# Patient Record
Sex: FEMALE | Race: WHITE
[De-identification: names, ages, dates, MRNs, and addresses within clinical notes are randomized per-mention and may not be internally consistent; named-entity substitution may affect disease eponyms.]

---

## 2018-10-16 ENCOUNTER — HOSPITAL ENCOUNTER (INPATIENT)
Dept: HOSPITAL 12 - ER | Age: 68
LOS: 7 days | Discharge: HOME HEALTH | DRG: 885 | End: 2018-10-23
Attending: INTERNAL MEDICINE
Payer: MEDICARE

## 2018-10-16 VITALS — HEIGHT: 61 IN | BODY MASS INDEX: 27.75 KG/M2 | WEIGHT: 147 LBS

## 2018-10-16 DIAGNOSIS — R73.03: ICD-10-CM

## 2018-10-16 DIAGNOSIS — F31.64: Primary | ICD-10-CM

## 2018-10-16 DIAGNOSIS — K59.00: ICD-10-CM

## 2018-10-16 DIAGNOSIS — Z87.01: ICD-10-CM

## 2018-10-16 DIAGNOSIS — F41.9: ICD-10-CM

## 2018-10-16 DIAGNOSIS — E44.0: ICD-10-CM

## 2018-10-16 DIAGNOSIS — F42.9: ICD-10-CM

## 2018-10-16 PROCEDURE — A4663 DIALYSIS BLOOD PRESSURE CUFF: HCPCS

## 2018-10-16 RX ADMIN — CLONAZEPAM PRN MG: 0.5 TABLET ORAL at 23:04

## 2018-10-16 NOTE — NUR
Pt. admitted to [facility], under care of Dr. GREEN/KEREN.

Diagnosis: Psychosis

Belongs List completed.  MRSA swab done.

report given to Tess TAPIA.

## 2018-10-16 NOTE — NUR
67 Y.O  FEMALE BROUGHT TO MHU VIA WHEELCHAIR FROM [facility]. PT ADMITTED IN A 5150 FOR GD. ACCORDING TO THE HOLD, 

PT MEETING CRITERIA OF UNABLE TO DO SELF-CARE ON HER HOME WHERE SHE LIVES 

ALONE. RN CONCURS WITH THE HOLD. ADVISEMENT AND PATIENT RIGHTS HANDBOOK 

GIVEN.

UPON FACE TO FACE ASSESSMENT. PT APPEARS TO REFLECT WHAT IS ON THE HOLD. 

PT IS A+OX2, AND COOPERATIVE WITH ASSESSMENT. PT CRYING DURING THE 

INTERVIEW AND WOULD SMILE AND GIGGLE . PT AGREED TO CONTRACT FOR SAFETY 

INSIDE THE HOSPITAL. PT GAVE VERBAL PERMISSION AND ALSO SIGNED THE PAPER 

TO MIGUE LEE AND GAUTAM LEE. PT EXHIBITS INCONGRUENT AFFECT, 

POOR EYE CONTACT. PT APPEARS UNKEMPT AND HER SKIN IS INTACT.  

AND DR.OLEG WASHINGTON NOTIFIED OF ADMISSION,ORDERS RECEIVED.SKILLED 

NURSING ASSESSMENT DONE. PT ORIENTED TO UNIT AND EDUCATED ON UNIT RULES. 

CONTRABAND (MEDICATION )SENT TO THE PHARMACY. HER [place] ID IS ON OUR 

SECURITY LOCKER IN THE UNIT. VS STABLE. NO AGGRESSIVE BEHAVIOR.

## 2018-10-17 VITALS — DIASTOLIC BLOOD PRESSURE: 49 MMHG | SYSTOLIC BLOOD PRESSURE: 115 MMHG

## 2018-10-17 VITALS — SYSTOLIC BLOOD PRESSURE: 148 MMHG | DIASTOLIC BLOOD PRESSURE: 52 MMHG

## 2018-10-17 VITALS — DIASTOLIC BLOOD PRESSURE: 57 MMHG | SYSTOLIC BLOOD PRESSURE: 119 MMHG

## 2018-10-17 VITALS — SYSTOLIC BLOOD PRESSURE: 116 MMHG | DIASTOLIC BLOOD PRESSURE: 56 MMHG

## 2018-10-17 LAB
CHOLEST SERPL-MCNC: 148 MG/DL (ref ?–200)
HDLC SERPL-MCNC: 69 MG/DL (ref 40–60)
TRIGL SERPL-MCNC: 53 MG/DL (ref 30–150)

## 2018-10-17 RX ADMIN — TEMAZEPAM PRN MG: 7.5 CAPSULE ORAL at 00:18

## 2018-10-17 RX ADMIN — CLONIDINE HYDROCHLORIDE SCH MG: 0.1 TABLET ORAL at 17:00

## 2018-10-17 RX ADMIN — CLONAZEPAM PRN MG: 0.5 TABLET ORAL at 06:28

## 2018-10-17 NOTE — NUR
Activity Group Note:  Patients engaged in listening to music of their choice while coloring 
a picture print available to them.  Patients were asked to engage with their peers to 
discuss the music or their pictures.



Subjective:  "I like this song....I like a lot of different artists like Irina Powell."



Objective:  Patient seemed eager to participate in the group activity.  She engaged and 
initiated conversation with her peers. Patient asked to see other patient's work.  Patient 
demonstrated some irritability when several patients were talking at the same time.



Assessment: Patient will need assistance in developing coping strategies when she cannot 
control her surroundings.



Plan:  Encourage group attendance as scheduled.   will continue to provide 
encouragement and support in helping the patient engage in group activities.

## 2018-10-17 NOTE — NUR
Initial Discharge Note: Patient currently lives alone in [facility][[address] [place] [address]]. According to the daughter, Vy [[phone]], the patient has a 
"caregiver" who checks in on patient whom also lives in [facility]. Per patient, 
she would like to return to her home when ready for discharge. Per PT evaluation, patient 
may benefit from a SNF or home health if returning home.  will continue to 
monitor patient and follow-up with PT on best plan for discharge.  will 
collaborate with patient, patient daughter, and MD on a safe and proper discharge.

## 2018-10-18 VITALS — SYSTOLIC BLOOD PRESSURE: 117 MMHG | DIASTOLIC BLOOD PRESSURE: 54 MMHG

## 2018-10-18 VITALS — SYSTOLIC BLOOD PRESSURE: 103 MMHG | DIASTOLIC BLOOD PRESSURE: 36 MMHG

## 2018-10-18 VITALS — SYSTOLIC BLOOD PRESSURE: 107 MMHG

## 2018-10-18 LAB
ALP SERPL-CCNC: 87 U/L (ref 50–136)
ALT SERPL W/O P-5'-P-CCNC: 21 U/L (ref 14–59)
AST SERPL-CCNC: 22 U/L (ref 15–37)
BASOPHILS # BLD AUTO: 0 K/UL (ref 0–8)
BASOPHILS NFR BLD AUTO: 0.7 % (ref 0–2)
BILIRUB SERPL-MCNC: 0.4 MG/DL (ref 0.2–1)
BUN SERPL-MCNC: 15 MG/DL (ref 7–18)
CHLORIDE SERPL-SCNC: 105 MMOL/L (ref 98–107)
CO2 SERPL-SCNC: 32 MMOL/L (ref 21–32)
CREAT SERPL-MCNC: 1.1 MG/DL (ref 0.6–1.3)
EOSINOPHIL # BLD AUTO: 0.2 K/UL (ref 0–0.7)
EOSINOPHIL NFR BLD AUTO: 3 % (ref 0–7)
GLUCOSE SERPL-MCNC: 108 MG/DL (ref 74–106)
HCT VFR BLD AUTO: 36.8 % (ref 31.2–41.9)
HGB BLD-MCNC: 12.1 G/DL (ref 10.9–14.3)
LYMPHOCYTES # BLD AUTO: 2.1 K/UL (ref 20–40)
LYMPHOCYTES NFR BLD AUTO: 34.1 % (ref 20.5–51.5)
MAGNESIUM SERPL-MCNC: 2.2 MG/DL (ref 1.8–2.4)
MCH RBC QN AUTO: 27.9 UUG (ref 24.7–32.8)
MCHC RBC AUTO-ENTMCNC: 33 G/DL (ref 32.3–35.6)
MCV RBC AUTO: 84.8 FL (ref 75.5–95.3)
MONOCYTES # BLD AUTO: 0.4 K/UL (ref 2–10)
MONOCYTES NFR BLD AUTO: 6.3 % (ref 0–11)
NEUTROPHILS # BLD AUTO: 3.4 K/UL (ref 1.8–8.9)
NEUTROPHILS NFR BLD AUTO: 55.9 % (ref 38.5–71.5)
PHOSPHATE SERPL-MCNC: 3.3 MG/DL (ref 2.5–4.9)
PLATELET # BLD AUTO: 408 K/UL (ref 179–408)
POTASSIUM SERPL-SCNC: 4.2 MMOL/L (ref 3.5–5.1)
RBC # BLD AUTO: 4.34 MIL/UL (ref 3.63–4.92)
TSH SERPL DL<=0.005 MIU/L-ACNC: 1.88 MIU/ML (ref 0.36–3.74)
WBC # BLD AUTO: 6.1 K/UL (ref 3.8–11.8)
WS STN SPEC: 6.8 G/DL (ref 6.4–8.2)

## 2018-10-18 RX ADMIN — FLUVOXAMINE MALEATE SCH MG: 50 TABLET, FILM COATED ORAL at 08:47

## 2018-10-18 RX ADMIN — ACETAMINOPHEN PRN MG: 325 TABLET ORAL at 03:11

## 2018-10-18 RX ADMIN — LAMOTRIGINE SCH MG: 100 TABLET ORAL at 20:37

## 2018-10-18 RX ADMIN — CLONIDINE HYDROCHLORIDE SCH MG: 0.1 TABLET ORAL at 17:00

## 2018-10-18 RX ADMIN — CLONIDINE HYDROCHLORIDE SCH MG: 0.1 TABLET ORAL at 08:48

## 2018-10-18 NOTE — NUR
RECEIVED PATIENT IN THE HALLWAY, HE IS NOTED A/O X 3. SHE IS ABLE TO AMBULATE WITH STEADY 
GAIT WITH THE AID OF A WALKER. SHE IS NOTED WITH LOW MOOD, BLUNTED AFFECT. SHE DENIES ANY 
THOUGHT TO HARM SELF, DENIES HI. DENIES AH/VH. SHE IS NOTED WITH FAIR INSIGHT AS TO THE 
REASON FOR HER ADMISSION TO MHU. SAFETY WAS EMPHASIS, WILL CONTINUE TO MONITOR.

## 2018-10-19 VITALS — SYSTOLIC BLOOD PRESSURE: 103 MMHG | DIASTOLIC BLOOD PRESSURE: 26 MMHG

## 2018-10-19 VITALS — DIASTOLIC BLOOD PRESSURE: 56 MMHG | SYSTOLIC BLOOD PRESSURE: 113 MMHG

## 2018-10-19 VITALS — SYSTOLIC BLOOD PRESSURE: 101 MMHG | DIASTOLIC BLOOD PRESSURE: 45 MMHG

## 2018-10-19 VITALS — DIASTOLIC BLOOD PRESSURE: 30 MMHG | SYSTOLIC BLOOD PRESSURE: 108 MMHG

## 2018-10-19 RX ADMIN — CLONIDINE HYDROCHLORIDE SCH MG: 0.1 TABLET ORAL at 16:53

## 2018-10-19 RX ADMIN — FLUVOXAMINE MALEATE SCH MG: 50 TABLET, FILM COATED ORAL at 08:28

## 2018-10-19 RX ADMIN — AZITHROMYCIN SCH MG: 250 TABLET, FILM COATED ORAL at 12:05

## 2018-10-19 RX ADMIN — ACETAMINOPHEN PRN MG: 325 TABLET ORAL at 12:10

## 2018-10-19 RX ADMIN — LAMOTRIGINE SCH MG: 100 TABLET ORAL at 20:04

## 2018-10-19 RX ADMIN — CLONIDINE HYDROCHLORIDE SCH MG: 0.1 TABLET ORAL at 08:28

## 2018-10-19 RX ADMIN — ACETAMINOPHEN PRN MG: 325 TABLET ORAL at 19:56

## 2018-10-19 RX ADMIN — CLONAZEPAM PRN MG: 0.5 TABLET ORAL at 19:51

## 2018-10-19 NOTE — NUR
Activity Group Note:



GOAL

Patient will actively participate in the group activity of the day or relax in the activity 
room with fellow patients.



INTERVENTION

 invited patient to participate in a group activity consisting of music, books 
puzzles and arts & crafts held from 2 -2:45 pm in the activities room.



RESPONSE

Patient expressed interest in participating in arts and crafts. Patient stated she wanted a 
large picture to color, as it is hard for her to see. Pt engaged in a coloring activity. 
Patient had minimal interaction with peers. Patients mood was within normal limits and 
remained cooperative during the activity.



PLAN

Patient will be invited to attend the next group activity.

## 2018-10-19 NOTE — NUR
Patient felt much better after c/o lower back pain. Prn medication administered x1 with 
relief. Call light within reach. No SI noted. Call light within reach. will monitor.

## 2018-10-20 VITALS — DIASTOLIC BLOOD PRESSURE: 46 MMHG | SYSTOLIC BLOOD PRESSURE: 125 MMHG

## 2018-10-20 VITALS — DIASTOLIC BLOOD PRESSURE: 51 MMHG | SYSTOLIC BLOOD PRESSURE: 114 MMHG

## 2018-10-20 VITALS — SYSTOLIC BLOOD PRESSURE: 110 MMHG | DIASTOLIC BLOOD PRESSURE: 46 MMHG

## 2018-10-20 RX ADMIN — CLONIDINE HYDROCHLORIDE SCH MG: 0.1 TABLET ORAL at 16:19

## 2018-10-20 RX ADMIN — CLONIDINE HYDROCHLORIDE SCH MG: 0.1 TABLET ORAL at 08:14

## 2018-10-20 RX ADMIN — AZITHROMYCIN SCH MG: 250 TABLET, FILM COATED ORAL at 11:00

## 2018-10-20 RX ADMIN — FLUVOXAMINE MALEATE SCH MG: 50 TABLET, FILM COATED ORAL at 08:13

## 2018-10-20 RX ADMIN — LAMOTRIGINE SCH MG: 100 TABLET ORAL at 21:13

## 2018-10-20 NOTE — NUR
Patient is compliant with all her medication. Redirectable. Able to express needs. Call 
light within reach. No SI noted. Attended group activity. Bed in low and locked position.

## 2018-10-21 VITALS — SYSTOLIC BLOOD PRESSURE: 114 MMHG | DIASTOLIC BLOOD PRESSURE: 47 MMHG

## 2018-10-21 VITALS — SYSTOLIC BLOOD PRESSURE: 139 MMHG | DIASTOLIC BLOOD PRESSURE: 66 MMHG

## 2018-10-21 VITALS — DIASTOLIC BLOOD PRESSURE: 49 MMHG | SYSTOLIC BLOOD PRESSURE: 127 MMHG

## 2018-10-21 LAB
APPEARANCE UR: CLEAR
BILIRUB UR QL STRIP: NEGATIVE
COLOR UR: YELLOW
GLUCOSE UR STRIP-MCNC: NEGATIVE MG/DL
KETONES UR STRIP-MCNC: NEGATIVE MG/DL
LEUKOCYTE ESTERASE UR QL STRIP: NEGATIVE
NITRITE UR QL STRIP: NEGATIVE
PH UR STRIP: 6 [PH] (ref 5–8)
PROT UR QL STRIP: NEGATIVE
SP GR UR STRIP: 1.02 (ref 1–1.03)
UROBILINOGEN UR STRIP-MCNC: 0.2 E.U./DL

## 2018-10-21 RX ADMIN — ACETAMINOPHEN PRN MG: 325 TABLET ORAL at 08:02

## 2018-10-21 RX ADMIN — CLONIDINE HYDROCHLORIDE SCH MG: 0.1 TABLET ORAL at 17:13

## 2018-10-21 RX ADMIN — FLUVOXAMINE MALEATE SCH MG: 50 TABLET, FILM COATED ORAL at 08:02

## 2018-10-21 RX ADMIN — DOCUSATE SODIUM PRN MG: 100 CAPSULE, LIQUID FILLED ORAL at 14:40

## 2018-10-21 RX ADMIN — CLONIDINE HYDROCHLORIDE SCH MG: 0.1 TABLET ORAL at 08:02

## 2018-10-21 RX ADMIN — LAMOTRIGINE SCH MG: 100 TABLET ORAL at 21:08

## 2018-10-21 RX ADMIN — AZITHROMYCIN SCH MG: 250 TABLET, FILM COATED ORAL at 11:01

## 2018-10-21 NOTE — NUR
Pt is A & O x3, calm, cooperative, med-compliant, cognizant, pt expresses concern about 
still having her part-time job once she is released from the hospital. Pt also expresses 
concern about not being on her home meds, consisting of lexapro and other medications. Pt is 
afraid that she will decompensate to her past OCD behavior because she is off of her home 
meds. I will endorse to day shift nurse to f/u.

## 2018-10-22 VITALS — DIASTOLIC BLOOD PRESSURE: 47 MMHG | SYSTOLIC BLOOD PRESSURE: 116 MMHG

## 2018-10-22 VITALS — SYSTOLIC BLOOD PRESSURE: 132 MMHG | DIASTOLIC BLOOD PRESSURE: 50 MMHG

## 2018-10-22 VITALS — SYSTOLIC BLOOD PRESSURE: 100 MMHG | DIASTOLIC BLOOD PRESSURE: 53 MMHG

## 2018-10-22 LAB
BASOPHILS # BLD AUTO: 0.1 K/UL (ref 0–8)
BASOPHILS NFR BLD AUTO: 1.3 % (ref 0–2)
BUN SERPL-MCNC: 18 MG/DL (ref 7–18)
CHLORIDE SERPL-SCNC: 104 MMOL/L (ref 98–107)
CO2 SERPL-SCNC: 30 MMOL/L (ref 21–32)
CREAT SERPL-MCNC: 0.9 MG/DL (ref 0.6–1.3)
DEPRECATED SQUAMOUS URNS QL MICRO: (no result) /HPF
EOSINOPHIL # BLD AUTO: 0.2 K/UL (ref 0–0.7)
EOSINOPHIL NFR BLD AUTO: 3.5 % (ref 0–7)
GLUCOSE SERPL-MCNC: 95 MG/DL (ref 74–106)
HCT VFR BLD AUTO: 38.4 % (ref 31.2–41.9)
HGB BLD-MCNC: 12.4 G/DL (ref 10.9–14.3)
LYMPHOCYTES # BLD AUTO: 2.4 K/UL (ref 20–40)
LYMPHOCYTES NFR BLD AUTO: 41.1 % (ref 20.5–51.5)
MAGNESIUM SERPL-MCNC: 2 MG/DL (ref 1.8–2.4)
MCH RBC QN AUTO: 27.6 UUG (ref 24.7–32.8)
MCHC RBC AUTO-ENTMCNC: 32 G/DL (ref 32.3–35.6)
MCV RBC AUTO: 85.2 FL (ref 75.5–95.3)
MONOCYTES # BLD AUTO: 0.3 K/UL (ref 2–10)
MONOCYTES NFR BLD AUTO: 5 % (ref 0–11)
NEUTROPHILS # BLD AUTO: 2.9 K/UL (ref 1.8–8.9)
NEUTROPHILS NFR BLD AUTO: 49.1 % (ref 38.5–71.5)
PHOSPHATE SERPL-MCNC: 3.3 MG/DL (ref 2.5–4.9)
PLATELET # BLD AUTO: 306 K/UL (ref 179–408)
POTASSIUM SERPL-SCNC: 4.2 MMOL/L (ref 3.5–5.1)
RBC # BLD AUTO: 4.51 MIL/UL (ref 3.63–4.92)
RBC #/AREA URNS HPF: (no result) /HPF (ref 0–3)
RENAL EPI CELLS #/AREA URNS LPF: (no result) /LPF
TRANS CELLS #/AREA URNS LPF: (no result) /LPF
WBC # BLD AUTO: 5.9 K/UL (ref 3.8–11.8)
WBC #/AREA URNS HPF: (no result) /HPF
WBC #/AREA URNS HPF: (no result) /HPF (ref 0–3)

## 2018-10-22 RX ADMIN — CLONIDINE HYDROCHLORIDE SCH MG: 0.1 TABLET ORAL at 09:37

## 2018-10-22 RX ADMIN — LAMOTRIGINE SCH MG: 100 TABLET ORAL at 21:05

## 2018-10-22 RX ADMIN — CLONIDINE HYDROCHLORIDE SCH MG: 0.1 TABLET ORAL at 17:00

## 2018-10-22 RX ADMIN — AZITHROMYCIN SCH MG: 250 TABLET, FILM COATED ORAL at 12:57

## 2018-10-22 RX ADMIN — FLUVOXAMINE MALEATE SCH MG: 50 TABLET, FILM COATED ORAL at 09:37

## 2018-10-22 NOTE — NUR
Firearms Report:  completed and submitted a [employer] firearms report for a a 5250 
grave disability certification.

## 2018-10-22 NOTE — NUR
RECEIVED PATIENT IN THE DAY ROOM WATCHING TV. SHE IS NOTED A/O X 3. SHE IS ABLE TO AMBULATE 
WITH STEADY GAIT. DENIES SI, DENIES AV/VH SHE IS ABLE TO CFS. SAFETY WAS EMPHASIS. WILL 
CONTINUE TO MONITOR,

## 2018-10-23 VITALS — SYSTOLIC BLOOD PRESSURE: 108 MMHG | DIASTOLIC BLOOD PRESSURE: 49 MMHG

## 2018-10-23 RX ADMIN — CLONIDINE HYDROCHLORIDE SCH MG: 0.1 TABLET ORAL at 08:49

## 2018-10-23 RX ADMIN — FLUVOXAMINE MALEATE SCH MG: 50 TABLET, FILM COATED ORAL at 08:11

## 2018-10-23 RX ADMIN — ACETAMINOPHEN PRN MG: 325 TABLET ORAL at 08:12

## 2018-10-23 RX ADMIN — TEMAZEPAM PRN MG: 7.5 CAPSULE ORAL at 00:03

## 2018-10-23 RX ADMIN — DOCUSATE SODIUM PRN MG: 100 CAPSULE, LIQUID FILLED ORAL at 13:38

## 2018-10-23 NOTE — NUR
Discharge Note:  Patient will be discharged back home today [[address], [address] 
[place] [address]; [phone]] via private transportation at 2pm.  Patients friend, Stacey Butler ([phone]) will be providing transportation for the patient.  Spoke with 
patients daughter, Vy ([phone]) who is aware and agreeable with discharge plans. 
Patient is alert and oriented x3, denies SI/HI, and is agreeable with discharge plans. 
Patient will continue to follow-up with her Primary Care Physician, Dr. Brandon Looney [[address] [place] [address]; [phone]].  Patient will also continue to 
follow-up with her outpatient Psychiatrist, Dr. Juaquin Powell [[address], [place] [address]; [phone]]. Per request, pt was provided with a list of 
Medicare-accepting Psychiatrists in her area.  A Home Health Order for home safety 
evaluation, medication management, and PT was faxed to [facility]  (ph. [phone]; fax 
[phone]).  Spoke with Rimma in intake, and she reports she will call this writer 
about patients acceptance and start of care date.  Patient was provided with outpatient 
mental health resources to [facility] Crisis Line [phone], Rosa Baig 
[phone], and the National Suicide Prevention Lifeline 1-278.989.8734.

-------------------------------------------------------------------------------

Addendum: 10/23/18 at 1213 by YANG FLOOD

-------------------------------------------------------------------------------

Received call from Rimma contreras [facility] ([phone]) who stated that 
patient was accepted for Home Health with a Start of Care on 10/24/18.

## 2018-10-23 NOTE — NUR
Pt is being discharged home and being picked up by her friend Stacey RAMON. Pt is A./O x 4, 
agreeing to go. No distress noted. Discharge instructions are given including following up 
with a psychiatrist. Pt verbalizes understanding.

## 2018-10-25 NOTE — NUR
Social Service Note: , Bri, received a voicemail left by the 
patient stating that she "needed to speak with her psychiatrist" and that it was an 
"emergency". Bri relayed message to , who called patient back at home phone number 
[[phone]]. Writer was unable to reach patient, but left a voicemail with direct phone 
number for patient to call back. Writer also instructed patient to go to emergency room if 
she was in fact experiencing a medical or psychiatric emergency.

## 2020-02-03 ENCOUNTER — HOSPITAL ENCOUNTER (INPATIENT)
Dept: HOSPITAL 12 - ER | Age: 70
LOS: 8 days | Discharge: HOME | DRG: 73 | End: 2020-02-11
Payer: MEDICARE

## 2020-02-03 VITALS — BODY MASS INDEX: 28.35 KG/M2 | HEIGHT: 63 IN | WEIGHT: 160 LBS

## 2020-02-03 VITALS — DIASTOLIC BLOOD PRESSURE: 66 MMHG | SYSTOLIC BLOOD PRESSURE: 112 MMHG

## 2020-02-03 VITALS — SYSTOLIC BLOOD PRESSURE: 101 MMHG | DIASTOLIC BLOOD PRESSURE: 54 MMHG

## 2020-02-03 VITALS — DIASTOLIC BLOOD PRESSURE: 66 MMHG | SYSTOLIC BLOOD PRESSURE: 125 MMHG

## 2020-02-03 DIAGNOSIS — J98.11: ICD-10-CM

## 2020-02-03 DIAGNOSIS — I50.31: ICD-10-CM

## 2020-02-03 DIAGNOSIS — T43.225A: ICD-10-CM

## 2020-02-03 DIAGNOSIS — F31.9: ICD-10-CM
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
## 2020-02-03 DIAGNOSIS — G90.8: Primary | ICD-10-CM

## 2020-02-03 DIAGNOSIS — T43.505A: ICD-10-CM

## 2020-02-03 DIAGNOSIS — M54.31: ICD-10-CM

## 2020-02-03 DIAGNOSIS — G92: ICD-10-CM

## 2020-02-03 DIAGNOSIS — F31.64: ICD-10-CM

## 2020-02-03 DIAGNOSIS — G25.2: ICD-10-CM

## 2020-02-03 DIAGNOSIS — R65.10: ICD-10-CM

## 2020-02-03 DIAGNOSIS — I21.A1: ICD-10-CM

## 2020-02-03 DIAGNOSIS — F42.9: ICD-10-CM
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
## 2020-02-03 DIAGNOSIS — I67.2: ICD-10-CM

## 2020-02-03 DIAGNOSIS — F41.9: ICD-10-CM

## 2020-02-03 DIAGNOSIS — G24.01: ICD-10-CM

## 2020-02-03 DIAGNOSIS — E86.0: ICD-10-CM

## 2020-02-03 LAB
ALP SERPL-CCNC: 100 U/L (ref 50–136)
ALT SERPL W/O P-5'-P-CCNC: 22 U/L (ref 14–59)
AMPHETAMINES UR QL SCN>1000 NG/ML: NEGATIVE
APPEARANCE UR: CLEAR
AST SERPL-CCNC: 24 U/L (ref 15–37)
BARBITURATES UR QL SCN: NEGATIVE
BASOPHILS # BLD AUTO: 0 K/UL (ref 0–8)
BASOPHILS NFR BLD AUTO: 0.4 % (ref 0–2)
BILIRUB SERPL-MCNC: 0.3 MG/DL (ref 0.2–1)
BILIRUB UR QL STRIP: NEGATIVE
BUN SERPL-MCNC: 21 MG/DL (ref 7–18)
CHLORIDE SERPL-SCNC: 104 MMOL/L (ref 98–107)
CO2 SERPL-SCNC: 28 MMOL/L (ref 21–32)
COCAINE UR QL SCN: NEGATIVE
COLOR UR: YELLOW
CREAT SERPL-MCNC: 1.1 MG/DL (ref 0.6–1.3)
EOSINOPHIL # BLD AUTO: 0 K/UL (ref 0–0.7)
EOSINOPHIL NFR BLD AUTO: 0.2 % (ref 0–7)
GLUCOSE SERPL-MCNC: 129 MG/DL (ref 74–106)
GLUCOSE UR STRIP-MCNC: NEGATIVE MG/DL
HCT VFR BLD AUTO: 37.2 % (ref 31.2–41.9)
HGB BLD-MCNC: 12.3 G/DL (ref 10.9–14.3)
HGB UR QL STRIP: NEGATIVE
KETONES UR STRIP-MCNC: NEGATIVE MG/DL
LEUKOCYTE ESTERASE UR QL STRIP: NEGATIVE
LYMPHOCYTES # BLD AUTO: 0.9 K/UL (ref 20–40)
LYMPHOCYTES NFR BLD AUTO: 7.6 % (ref 20.5–51.5)
MAGNESIUM SERPL-MCNC: 2 MG/DL (ref 1.8–2.4)
MCH RBC QN AUTO: 27.8 UUG (ref 24.7–32.8)
MCHC RBC AUTO-ENTMCNC: 33 G/DL (ref 32.3–35.6)
MCV RBC AUTO: 84.1 FL (ref 75.5–95.3)
MONOCYTES # BLD AUTO: 0.3 K/UL (ref 2–10)
MONOCYTES NFR BLD AUTO: 2.8 % (ref 0–11)
NEUTROPHILS # BLD AUTO: 10.7 K/UL (ref 1.8–8.9)
NEUTROPHILS NFR BLD AUTO: 89 % (ref 38.5–71.5)
NITRITE UR QL STRIP: NEGATIVE
OPIATES UR QL SCN: NEGATIVE
PCP UR QL SCN>25 NG/ML: NEGATIVE
PH UR STRIP: 5.5 [PH] (ref 5–8)
PHOSPHATE SERPL-MCNC: 2.7 MG/DL (ref 2.5–4.9)
PLATELET # BLD AUTO: 222 K/UL (ref 179–408)
POTASSIUM SERPL-SCNC: 3.6 MMOL/L (ref 3.5–5.1)
RBC # BLD AUTO: 4.42 MIL/UL (ref 3.63–4.92)
SP GR UR STRIP: >=1.03 (ref 1–1.03)
THC UR QL SCN>50 NG/ML: NEGATIVE
UROBILINOGEN UR STRIP-MCNC: 0.2 E.U./DL
WBC # BLD AUTO: 12 K/UL (ref 3.8–11.8)
WS STN SPEC: 7.5 G/DL (ref 6.4–8.2)

## 2020-02-03 PROCEDURE — A4217 STERILE WATER/SALINE, 500 ML: HCPCS

## 2020-02-03 PROCEDURE — A4663 DIALYSIS BLOOD PRESSURE CUFF: HCPCS

## 2020-02-03 PROCEDURE — C1758 CATHETER, URETERAL: HCPCS

## 2020-02-03 PROCEDURE — G0378 HOSPITAL OBSERVATION PER HR: HCPCS

## 2020-02-03 RX ADMIN — LORAZEPAM PRN MG: 2 INJECTION INTRAMUSCULAR; INTRAVENOUS at 22:29

## 2020-02-03 RX ADMIN — ACETAMINOPHEN PRN MG: 325 TABLET ORAL at 20:03

## 2020-02-03 RX ADMIN — Medication SCH MG: at 20:51

## 2020-02-03 RX ADMIN — ATORVASTATIN CALCIUM SCH MG: 10 TABLET, FILM COATED ORAL at 20:34

## 2020-02-03 RX ADMIN — SODIUM CHLORIDE PRN MLS/HR: 0.9 INJECTION, SOLUTION INTRAVENOUS at 20:29

## 2020-02-03 NOTE — NUR
PATIENT ALERT ORIENTED NO SOB NO CHEST PAIN, TELE MONITOR SINUS RHYTHM, SINUS TACHY.  
PATIENT HAS ANXIETY MB RESTLESSNESS WHILE IN BED, PATIENT GIVEN FOOD AND JUICES, KEPT CLEAN 
AND DRY, CONT TO MONITOR.

## 2020-02-03 NOTE — NUR
PATIENT HAVING LOTS OF ANXIETY, RESTLESSNESS IN BED, NOTIFY ROOPA KEATING NP WITH ORDER OF 
ATIVAN IV PRN.

## 2020-02-03 NOTE — NUR
Patient received from ER around 1240 patient in stable condition with stable vital signs; 
patient family at bedside towards end of shift ; patient very shaky ; Md notified ; strait 
cath done for urine sample. admission finished. Report given on coming nurse.

## 2020-02-04 VITALS — DIASTOLIC BLOOD PRESSURE: 62 MMHG | SYSTOLIC BLOOD PRESSURE: 111 MMHG

## 2020-02-04 VITALS — SYSTOLIC BLOOD PRESSURE: 114 MMHG | DIASTOLIC BLOOD PRESSURE: 52 MMHG

## 2020-02-04 VITALS — DIASTOLIC BLOOD PRESSURE: 46 MMHG | SYSTOLIC BLOOD PRESSURE: 97 MMHG

## 2020-02-04 VITALS — DIASTOLIC BLOOD PRESSURE: 59 MMHG | SYSTOLIC BLOOD PRESSURE: 117 MMHG

## 2020-02-04 VITALS — DIASTOLIC BLOOD PRESSURE: 47 MMHG | SYSTOLIC BLOOD PRESSURE: 96 MMHG

## 2020-02-04 VITALS — DIASTOLIC BLOOD PRESSURE: 46 MMHG | SYSTOLIC BLOOD PRESSURE: 95 MMHG

## 2020-02-04 VITALS — DIASTOLIC BLOOD PRESSURE: 47 MMHG | SYSTOLIC BLOOD PRESSURE: 102 MMHG

## 2020-02-04 VITALS — DIASTOLIC BLOOD PRESSURE: 54 MMHG | SYSTOLIC BLOOD PRESSURE: 99 MMHG

## 2020-02-04 LAB
ALP SERPL-CCNC: 91 U/L (ref 50–136)
ALT SERPL W/O P-5'-P-CCNC: 45 U/L (ref 14–59)
AST SERPL-CCNC: 108 U/L (ref 15–37)
BASOPHILS # BLD AUTO: 0.1 K/UL (ref 0–8)
BASOPHILS NFR BLD AUTO: 0.5 % (ref 0–2)
BILIRUB SERPL-MCNC: 0.6 MG/DL (ref 0.2–1)
BUN SERPL-MCNC: 16 MG/DL (ref 7–18)
CHLORIDE SERPL-SCNC: 106 MMOL/L (ref 98–107)
CHOLEST SERPL-MCNC: 188 MG/DL (ref ?–200)
CO2 SERPL-SCNC: 27 MMOL/L (ref 21–32)
CREAT SERPL-MCNC: 1 MG/DL (ref 0.6–1.3)
EOSINOPHIL # BLD AUTO: 0 K/UL (ref 0–0.7)
EOSINOPHIL NFR BLD AUTO: 0.2 % (ref 0–7)
GLUCOSE SERPL-MCNC: 99 MG/DL (ref 74–106)
HCT VFR BLD AUTO: 35.1 % (ref 31.2–41.9)
HDLC SERPL-MCNC: 78 MG/DL (ref 40–60)
HGB BLD-MCNC: 11.6 G/DL (ref 10.9–14.3)
LYMPHOCYTES # BLD AUTO: 1.8 K/UL (ref 20–40)
LYMPHOCYTES NFR BLD AUTO: 16.9 % (ref 20.5–51.5)
MAGNESIUM SERPL-MCNC: 2 MG/DL (ref 1.8–2.4)
MCH RBC QN AUTO: 28.1 UUG (ref 24.7–32.8)
MCHC RBC AUTO-ENTMCNC: 33 G/DL (ref 32.3–35.6)
MCV RBC AUTO: 85.2 FL (ref 75.5–95.3)
MONOCYTES # BLD AUTO: 0.6 K/UL (ref 2–10)
MONOCYTES NFR BLD AUTO: 5.5 % (ref 0–11)
NEUTROPHILS # BLD AUTO: 8.3 K/UL (ref 1.8–8.9)
NEUTROPHILS NFR BLD AUTO: 76.9 % (ref 38.5–71.5)
PHOSPHATE SERPL-MCNC: 2.7 MG/DL (ref 2.5–4.9)
PLATELET # BLD AUTO: 197 K/UL (ref 179–408)
POTASSIUM SERPL-SCNC: 3.8 MMOL/L (ref 3.5–5.1)
RBC # BLD AUTO: 4.12 MIL/UL (ref 3.63–4.92)
TRIGL SERPL-MCNC: 58 MG/DL (ref 30–150)
TSH SERPL DL<=0.005 MIU/L-ACNC: 1.57 MIU/ML (ref 0.36–3.74)
WBC # BLD AUTO: 10.8 K/UL (ref 3.8–11.8)
WS STN SPEC: 6.9 G/DL (ref 6.4–8.2)

## 2020-02-04 RX ADMIN — Medication SCH EACH: at 16:53

## 2020-02-04 RX ADMIN — Medication SCH MG: at 08:50

## 2020-02-04 RX ADMIN — LAMOTRIGINE SCH MG: 200 TABLET ORAL at 14:28

## 2020-02-04 RX ADMIN — ATORVASTATIN CALCIUM SCH MG: 10 TABLET, FILM COATED ORAL at 20:49

## 2020-02-04 RX ADMIN — PANTOPRAZOLE SODIUM SCH MG: 40 TABLET, DELAYED RELEASE ORAL at 06:58

## 2020-02-04 RX ADMIN — ACETAMINOPHEN PRN MG: 325 TABLET ORAL at 05:56

## 2020-02-04 RX ADMIN — SODIUM CHLORIDE PRN MLS/HR: 0.9 INJECTION, SOLUTION INTRAVENOUS at 09:05

## 2020-02-04 RX ADMIN — SODIUM CHLORIDE PRN MLS/HR: 0.9 INJECTION, SOLUTION INTRAVENOUS at 22:37

## 2020-02-04 RX ADMIN — LORAZEPAM PRN MG: 2 INJECTION INTRAMUSCULAR; INTRAVENOUS at 22:29

## 2020-02-04 RX ADMIN — LAMOTRIGINE SCH MG: 200 TABLET ORAL at 20:49

## 2020-02-04 RX ADMIN — Medication SCH MG: at 20:54

## 2020-02-04 RX ADMIN — FLUVOXAMINE MALEATE SCH MG: 50 TABLET, FILM COATED ORAL at 20:54

## 2020-02-04 RX ADMIN — ASPIRIN SCH MG: 81 TABLET, COATED ORAL at 08:46

## 2020-02-04 RX ADMIN — Medication SCH EACH: at 22:16

## 2020-02-04 NOTE — NUR
AWAKE ALERT AND RESPOND APPROPRIATELY ONLY TO SIMPLE QUESTIONS. GETS CONFUSED ON AND OFF AND 
TRYING TO GET OUT OF BED. OBSERVED.

## 2020-02-04 NOTE — NUR
PATIENT ALERT, NO SOB NO CHEST PAIN NOTED, TELE MONITOR SINUS RYTHYM AT THIS TIME. PATIENT 
NOTED WITH RESTLESSNESS IN BED, KEPT PATIENT CLEAN DRY AND COMFORTABLE, CALL LIGHT WITHIN 
REACH. CONT TO MONITOR.

## 2020-02-04 NOTE — NUR
PATIENT ALWAYS TRYING TO GET OUT OF BED, PLACED PATIENT TO ROOM 311 CLOSER TO NURSING 
STATION FOR SAFETY

## 2020-02-05 VITALS — DIASTOLIC BLOOD PRESSURE: 40 MMHG | SYSTOLIC BLOOD PRESSURE: 101 MMHG

## 2020-02-05 VITALS — DIASTOLIC BLOOD PRESSURE: 48 MMHG | SYSTOLIC BLOOD PRESSURE: 99 MMHG

## 2020-02-05 VITALS — SYSTOLIC BLOOD PRESSURE: 132 MMHG | DIASTOLIC BLOOD PRESSURE: 56 MMHG

## 2020-02-05 VITALS — DIASTOLIC BLOOD PRESSURE: 60 MMHG | SYSTOLIC BLOOD PRESSURE: 125 MMHG

## 2020-02-05 VITALS — SYSTOLIC BLOOD PRESSURE: 124 MMHG | DIASTOLIC BLOOD PRESSURE: 54 MMHG

## 2020-02-05 LAB
ALP SERPL-CCNC: 81 U/L (ref 50–136)
ALT SERPL W/O P-5'-P-CCNC: 72 U/L (ref 14–59)
AST SERPL-CCNC: 143 U/L (ref 15–37)
BASOPHILS # BLD AUTO: 0.1 K/UL (ref 0–8)
BASOPHILS NFR BLD AUTO: 0.8 % (ref 0–2)
BILIRUB SERPL-MCNC: 0.5 MG/DL (ref 0.2–1)
BUN SERPL-MCNC: 13 MG/DL (ref 7–18)
CHLORIDE SERPL-SCNC: 106 MMOL/L (ref 98–107)
CO2 SERPL-SCNC: 28 MMOL/L (ref 21–32)
CREAT SERPL-MCNC: 0.9 MG/DL (ref 0.6–1.3)
EOSINOPHIL # BLD AUTO: 0.1 K/UL (ref 0–0.7)
EOSINOPHIL NFR BLD AUTO: 0.5 % (ref 0–7)
GLUCOSE SERPL-MCNC: 111 MG/DL (ref 74–106)
HCT VFR BLD AUTO: 34.1 % (ref 31.2–41.9)
HGB BLD-MCNC: 11.1 G/DL (ref 10.9–14.3)
LYMPHOCYTES # BLD AUTO: 2 K/UL (ref 20–40)
LYMPHOCYTES NFR BLD AUTO: 15.8 % (ref 20.5–51.5)
MAGNESIUM SERPL-MCNC: 1.9 MG/DL (ref 1.8–2.4)
MCH RBC QN AUTO: 27.6 UUG (ref 24.7–32.8)
MCHC RBC AUTO-ENTMCNC: 33 G/DL (ref 32.3–35.6)
MCV RBC AUTO: 85 FL (ref 75.5–95.3)
MONOCYTES # BLD AUTO: 0.7 K/UL (ref 2–10)
MONOCYTES NFR BLD AUTO: 5.8 % (ref 0–11)
NEUTROPHILS # BLD AUTO: 9.6 K/UL (ref 1.8–8.9)
NEUTROPHILS NFR BLD AUTO: 77.1 % (ref 38.5–71.5)
PHOSPHATE SERPL-MCNC: 1.9 MG/DL (ref 2.5–4.9)
PLATELET # BLD AUTO: 186 K/UL (ref 179–408)
POTASSIUM SERPL-SCNC: 3.4 MMOL/L (ref 3.5–5.1)
RBC # BLD AUTO: 4.01 MIL/UL (ref 3.63–4.92)
WBC # BLD AUTO: 12.5 K/UL (ref 3.8–11.8)
WS STN SPEC: 6.5 G/DL (ref 6.4–8.2)

## 2020-02-05 RX ADMIN — LORAZEPAM PRN MG: 2 INJECTION INTRAMUSCULAR; INTRAVENOUS at 11:59

## 2020-02-05 RX ADMIN — LAMOTRIGINE SCH MG: 200 TABLET ORAL at 21:00

## 2020-02-05 RX ADMIN — HYDROCODONE BITARTRATE AND ACETAMINOPHEN PRN TAB: 5; 325 TABLET ORAL at 22:57

## 2020-02-05 RX ADMIN — ACETAMINOPHEN PRN MG: 325 TABLET ORAL at 09:12

## 2020-02-05 RX ADMIN — ACETAMINOPHEN PRN MG: 325 TABLET ORAL at 20:43

## 2020-02-05 RX ADMIN — ESCITALOPRAM OXALATE SCH MG: 10 TABLET, FILM COATED ORAL at 09:13

## 2020-02-05 RX ADMIN — Medication SCH MG: at 09:12

## 2020-02-05 RX ADMIN — ATORVASTATIN CALCIUM SCH MG: 10 TABLET, FILM COATED ORAL at 20:44

## 2020-02-05 RX ADMIN — HYDROCODONE BITARTRATE AND ACETAMINOPHEN PRN TAB: 5; 325 TABLET ORAL at 00:47

## 2020-02-05 RX ADMIN — Medication SCH EACH: at 20:49

## 2020-02-05 RX ADMIN — Medication SCH MG: at 09:13

## 2020-02-05 RX ADMIN — PANTOPRAZOLE SODIUM SCH MG: 40 TABLET, DELAYED RELEASE ORAL at 06:39

## 2020-02-05 RX ADMIN — LAMOTRIGINE SCH MG: 200 TABLET ORAL at 09:17

## 2020-02-05 RX ADMIN — Medication SCH EACH: at 11:52

## 2020-02-05 RX ADMIN — SODIUM CHLORIDE PRN MLS/HR: 0.9 INJECTION, SOLUTION INTRAVENOUS at 18:02

## 2020-02-05 RX ADMIN — FLUVOXAMINE MALEATE SCH MG: 50 TABLET, FILM COATED ORAL at 20:45

## 2020-02-05 RX ADMIN — Medication SCH EACH: at 06:46

## 2020-02-05 RX ADMIN — Medication SCH EACH: at 18:00

## 2020-02-05 RX ADMIN — ASPIRIN SCH MG: 81 TABLET, COATED ORAL at 09:12

## 2020-02-05 NOTE — NUR
SPOKE WITH DR MARROQUIN REGARDING CTA CHEST IN AM AT [place].  WILL FOLLOW-UP IN 
AM. MEANWHILE PATIENT KEPT ON NPO.

## 2020-02-05 NOTE — NUR
RECEIVED PT AWAKE, ALERT AND ORIENTEDX2. PT CAN MAKE HER NEEDS KNOWN. PT COOPERATIVE WITH 
CARE. PT IN NO ACUTE DISTRESS. IV INTACT. SAFETY AND COMFORT PROVIDED. WILL CONTINUE TO 
MONITOR.

## 2020-02-05 NOTE — NUR
called Dr. Darien Siddiqui for pain mgt consult. Left message to Wanda, office 
, and awaiting call back. seen by physical therapy for eval and will follow-up for 
further therapy.

-------------------------------------------------------------------------------

Addendum: 02/05/20 at 1142 by KI TORRES RN

-------------------------------------------------------------------------------

ERROR

## 2020-02-05 NOTE — NUR
PATIENT COMPLAIN OF LOWER ABDOMINAL PAIN, SCAN PATIENT BLADDER WITH 800CC URINE ON THE 
BLADDER, NOTIFY QUETA OLIVEROS NP WITH ORDER TO INSERT RENO CATH FOR BLADDER RETENTION. NOTIFY 
PATIENT AND PATIENT AGREE.  PLACED RENO CATH WITH 650CC URINE OBTAIN YELLOW/JOAN COLOR 
OBTAINED, CONT TO MONITOR.

## 2020-02-05 NOTE — NUR
SEEN BY DAKOTA SIERRA FOR FOLLOW-UP, NOTED PATIENT IS MORE CONFUSED AND RESTLESS AT THIS 
TIME. FOLLOW-UP LABS AND ALSO LACTIC ACID ORDERED. WHEN TRIED TO WEAN OFF TO ROOM AIR, 
PATIENT DESATURATED TO 82%. O2 AT 2L RE-ADMINISTERED

## 2020-02-05 NOTE — NUR
PATIENT ALERT WITH CONFUSION, NO SOB NO CHEST PAIN. TELE MONITOR SINUS RHYTHM. PATIENT RENO 
CATH PATENT DRAINING YELLOW COLOR URINE. PATIENT HAS EPISODE OF ANXIETY RESTLESS IN BED, 
GIVEN ATIVAN AS ORDERED WITH SOME HELP, PATIENT GIVEN PAIN MEDS WITH HELP, ABDOMEN SOFT. 
WILL CALL FAMILY FOR CONSENT CTA OF THE BRAIN AND CAROTED. ENDORSE TO NEXT SHIFT.

## 2020-02-06 ENCOUNTER — HOSPITAL ENCOUNTER (OUTPATIENT)
Dept: HOSPITAL 54 - CT | Age: 70
Discharge: HOME | End: 2020-02-06
Attending: INTERNAL MEDICINE
Payer: MEDICARE

## 2020-02-06 VITALS — SYSTOLIC BLOOD PRESSURE: 119 MMHG | DIASTOLIC BLOOD PRESSURE: 42 MMHG

## 2020-02-06 VITALS — DIASTOLIC BLOOD PRESSURE: 48 MMHG | SYSTOLIC BLOOD PRESSURE: 106 MMHG

## 2020-02-06 VITALS — DIASTOLIC BLOOD PRESSURE: 66 MMHG | SYSTOLIC BLOOD PRESSURE: 134 MMHG

## 2020-02-06 VITALS — SYSTOLIC BLOOD PRESSURE: 105 MMHG | DIASTOLIC BLOOD PRESSURE: 45 MMHG

## 2020-02-06 VITALS — DIASTOLIC BLOOD PRESSURE: 46 MMHG | SYSTOLIC BLOOD PRESSURE: 107 MMHG

## 2020-02-06 VITALS — DIASTOLIC BLOOD PRESSURE: 58 MMHG | SYSTOLIC BLOOD PRESSURE: 124 MMHG

## 2020-02-06 VITALS — SYSTOLIC BLOOD PRESSURE: 132 MMHG | DIASTOLIC BLOOD PRESSURE: 40 MMHG

## 2020-02-06 VITALS — WEIGHT: 160 LBS | HEIGHT: 63 IN | BODY MASS INDEX: 28.35 KG/M2

## 2020-02-06 VITALS — DIASTOLIC BLOOD PRESSURE: 42 MMHG | SYSTOLIC BLOOD PRESSURE: 103 MMHG

## 2020-02-06 VITALS — SYSTOLIC BLOOD PRESSURE: 110 MMHG | DIASTOLIC BLOOD PRESSURE: 54 MMHG

## 2020-02-06 VITALS — DIASTOLIC BLOOD PRESSURE: 33 MMHG | SYSTOLIC BLOOD PRESSURE: 93 MMHG

## 2020-02-06 DIAGNOSIS — J90: Primary | ICD-10-CM

## 2020-02-06 DIAGNOSIS — M47.814: ICD-10-CM

## 2020-02-06 DIAGNOSIS — J98.11: ICD-10-CM

## 2020-02-06 PROCEDURE — 75574 CT ANGIO HRT W/3D IMAGE: CPT

## 2020-02-06 RX ADMIN — ESCITALOPRAM OXALATE SCH MG: 10 TABLET, FILM COATED ORAL at 10:52

## 2020-02-06 RX ADMIN — FLUVOXAMINE MALEATE SCH MG: 50 TABLET, FILM COATED ORAL at 21:33

## 2020-02-06 RX ADMIN — DEXTROSE SCH MLS/HR: 50 INJECTION, SOLUTION INTRAVENOUS at 22:12

## 2020-02-06 RX ADMIN — Medication SCH EACH: at 06:34

## 2020-02-06 RX ADMIN — PANTOPRAZOLE SODIUM SCH MG: 40 TABLET, DELAYED RELEASE ORAL at 06:25

## 2020-02-06 RX ADMIN — SODIUM CHLORIDE PRN MLS/HR: 0.9 INJECTION, SOLUTION INTRAVENOUS at 08:00

## 2020-02-06 RX ADMIN — Medication SCH EACH: at 12:40

## 2020-02-06 RX ADMIN — LAMOTRIGINE SCH MG: 200 TABLET ORAL at 09:00

## 2020-02-06 RX ADMIN — Medication SCH EACH: at 21:39

## 2020-02-06 RX ADMIN — ASPIRIN SCH MG: 81 TABLET, COATED ORAL at 10:52

## 2020-02-06 RX ADMIN — HYDROCODONE BITARTRATE AND ACETAMINOPHEN PRN TAB: 5; 325 TABLET ORAL at 13:33

## 2020-02-06 RX ADMIN — DEXTROSE SCH MLS/HR: 50 INJECTION, SOLUTION INTRAVENOUS at 01:25

## 2020-02-06 RX ADMIN — Medication SCH EACH: at 17:55

## 2020-02-06 NOTE — NUR
NOTIFIED DAKOTA SIERRA THAT DR. GREEN WAS HERE TO SEE PATIENT BUT THE PATIENT WAS IN CATH 
LAB. HE DECIDED TO LEAVE AND STATED THAT HE WILL BE BACK TOMORROW. NURSE WAS UNABLE TO TALK 
WITH DR. GREEN. NURSE WANTED TO KNOW IF PATIENT SHOULD CONTINUE HIS PSYCH MEDS. DAKOTA 
STATED TO LEAVE HER ORDERS TO CONTINUE PSYCH MEDS AS IS.

## 2020-02-06 NOTE — NUR
RECEIVED PATIENT IN BED AWAKE, AOX2 TO PERSON AND PLACE. NO SIGNS OF DISTRESS AT THIS TIME. 
PATIENT DENIES PAIN AT THIS TIME. RIGHT HAND AND RIGHT AC PERIPHERAL LINE INTACT AND 
FLUSHED. ALL NEEDS MET AT THIS TIME. SAFETY AND FALL PRECAUTIONS IN PLACE CALL LIGHT IN 
REACH. BED IN LOW AND LOCKED POSITION. WILL CONTINUE TO MONITOR.

## 2020-02-06 NOTE — NUR
PATIENT GIVEN ATIVAN BEFORE THE PROCEDURE CARDIAC CTA AS PLANED FOR 11 AM BUT TEST WAS 
RESCHEDULED FOR 2PM. PATIENT IS MILDLY LETHARGIC AND LAMICTAL ON HOLD THIS AM.

## 2020-02-06 NOTE — NUR
PATIENT ASLEEP BUT AROUSABLE, NO SOB NO CHEST PAIN, BP WAS LOW, WILL GIVE BOLUS NS 1000CC AS 
ORDERED. PATIENT HAS NO S/S OF DISTRESS, KEPT HOB LOWERED, CONT TO MONITOR.

## 2020-02-06 NOTE — NUR
CALLED [facility] BECAUSE PATIENT BP DROPED 87/42 HR 75. PATIENT RESPONSIVE TO NAME AND WHAT 
HOSPITAL SHE IS IN . DAKOTA SIERRA WAS CALLED. ORDERS GIVEN.

## 2020-02-06 NOTE — NUR
PT SLEPT INTERMITTENTLY. PRESCRIBED MEDICATION GIVEN AND PT TOLERATED IT WELL. IV INTACT. 
DR. GREEN SEEN THE PT.  RENO CATHETER INTACT AND DRAINING WELL. PT DIDN'T GET PROTONIX 
BECAUSE PT IS ON NPO FOR CTA HEART. SAFETY AND COMFORT PROVIDED. ALL NEEDS ARE MET. WILL 
ENDORSE ACCORDINGLY TO INCOMING NURSE FOR CONTINUITY OF CARE.

## 2020-02-06 NOTE — NUR
Pt s/p CTA. 



Pt arrived A&Ox2, drowsy unable to follow some commands, forgetful. Per EMT report, pt 
received ativan and norco prior to arrival. Dr Au informed. 



CTA completed without any adverse effects. Report given to EMT for RADHA.

## 2020-02-07 VITALS — SYSTOLIC BLOOD PRESSURE: 127 MMHG | DIASTOLIC BLOOD PRESSURE: 63 MMHG

## 2020-02-07 VITALS — DIASTOLIC BLOOD PRESSURE: 61 MMHG | SYSTOLIC BLOOD PRESSURE: 140 MMHG

## 2020-02-07 VITALS — DIASTOLIC BLOOD PRESSURE: 54 MMHG | SYSTOLIC BLOOD PRESSURE: 112 MMHG

## 2020-02-07 VITALS — DIASTOLIC BLOOD PRESSURE: 47 MMHG | SYSTOLIC BLOOD PRESSURE: 115 MMHG

## 2020-02-07 VITALS — DIASTOLIC BLOOD PRESSURE: 66 MMHG | SYSTOLIC BLOOD PRESSURE: 127 MMHG

## 2020-02-07 LAB
BASOPHILS # BLD AUTO: 0 K/UL (ref 0–8)
BASOPHILS NFR BLD AUTO: 0.5 % (ref 0–2)
BUN SERPL-MCNC: 11 MG/DL (ref 7–18)
CHLORIDE SERPL-SCNC: 107 MMOL/L (ref 98–107)
CO2 SERPL-SCNC: 30 MMOL/L (ref 21–32)
CREAT SERPL-MCNC: 0.9 MG/DL (ref 0.6–1.3)
EOSINOPHIL # BLD AUTO: 0.3 K/UL (ref 0–0.7)
EOSINOPHIL NFR BLD AUTO: 4.2 % (ref 0–7)
GLUCOSE SERPL-MCNC: 99 MG/DL (ref 74–106)
HCT VFR BLD AUTO: 31 % (ref 31.2–41.9)
HGB BLD-MCNC: 10.3 G/DL (ref 10.9–14.3)
LYMPHOCYTES # BLD AUTO: 1.9 K/UL (ref 20–40)
LYMPHOCYTES NFR BLD AUTO: 22.5 % (ref 20.5–51.5)
MAGNESIUM SERPL-MCNC: 1.9 MG/DL (ref 1.8–2.4)
MCH RBC QN AUTO: 28.3 UUG (ref 24.7–32.8)
MCHC RBC AUTO-ENTMCNC: 33 G/DL (ref 32.3–35.6)
MCV RBC AUTO: 85 FL (ref 75.5–95.3)
MONOCYTES # BLD AUTO: 0.5 K/UL (ref 2–10)
MONOCYTES NFR BLD AUTO: 5.7 % (ref 0–11)
NEUTROPHILS # BLD AUTO: 5.6 K/UL (ref 1.8–8.9)
NEUTROPHILS NFR BLD AUTO: 67.1 % (ref 38.5–71.5)
PHOSPHATE SERPL-MCNC: 2.3 MG/DL (ref 2.5–4.9)
PLATELET # BLD AUTO: 185 K/UL (ref 179–408)
POTASSIUM SERPL-SCNC: 3.2 MMOL/L (ref 3.5–5.1)
RBC # BLD AUTO: 3.65 MIL/UL (ref 3.63–4.92)
WBC # BLD AUTO: 8.3 K/UL (ref 3.8–11.8)

## 2020-02-07 RX ADMIN — PANTOPRAZOLE SODIUM SCH MG: 40 TABLET, DELAYED RELEASE ORAL at 05:45

## 2020-02-07 RX ADMIN — LAMOTRIGINE SCH MG: 200 TABLET ORAL at 20:52

## 2020-02-07 RX ADMIN — Medication SCH EACH: at 20:41

## 2020-02-07 RX ADMIN — SODIUM CHLORIDE PRN MLS/HR: 0.9 INJECTION, SOLUTION INTRAVENOUS at 02:00

## 2020-02-07 RX ADMIN — ESCITALOPRAM OXALATE SCH MG: 10 TABLET, FILM COATED ORAL at 08:56

## 2020-02-07 RX ADMIN — FLUVOXAMINE MALEATE SCH MG: 50 TABLET, FILM COATED ORAL at 20:51

## 2020-02-07 RX ADMIN — HYDROCODONE BITARTRATE AND ACETAMINOPHEN PRN TAB: 5; 325 TABLET ORAL at 03:33

## 2020-02-07 RX ADMIN — Medication SCH EACH: at 17:13

## 2020-02-07 RX ADMIN — Medication SCH EACH: at 12:10

## 2020-02-07 RX ADMIN — Medication SCH EACH: at 05:45

## 2020-02-07 RX ADMIN — ACETAMINOPHEN PRN MG: 325 TABLET ORAL at 20:18

## 2020-02-07 RX ADMIN — DEXTROSE SCH MLS/HR: 50 INJECTION, SOLUTION INTRAVENOUS at 20:44

## 2020-02-07 NOTE — NUR
Assisted out of bed by PT ambulated with FWW outside of the room. No dizziness noted. 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 Patient denies any history of suicidal attempts. Several Relatives C/o having a diarrhea. Pending collateral Tele psychiatry Dr Lay

## 2020-02-07 NOTE — NUR
DR. GREEN AT BEDSIDE TO ASSESS PATIENT. PER DR. GREEN, PATIENT IS TO BE ADMITTED IN AM 
AS [facility]-PSYCH OVERFLOW, VOLUNTARY AND WEEKEND PSYCH DOCTOR TO ADMIT. RN CHARGE NOTIFIED. ALL 
NEEDS ATTENDED. WILL CONTINUE TO MONITOR.

## 2020-02-07 NOTE — NUR
NS 1L bolus given as ordered. [facility] Crisis Team seen patient with daughter at bedside. 
Patient signed voluntarily for MHU. Cher Paredes informed.

## 2020-02-07 NOTE — NUR
PATIENT ALERT ORIENTED, NO SOB NO CHEST PAIN, TELE MONITOR SINUS RHYTHM AT THIS TIME. 
PATIENT HAS NO FURTHER COMPLAIN OF BACK AND LEFT THIGH PAIN, MEDICATION FOR PAIN WAS 
EFFECTIVE, RENO CATH PATENT DRAINING WITH YELLOW COLOR URINE IN MODERATE AMOUNT. PATIENT 
HAS COMPLAIN OF DIZZINESS ANYMORE, CONT TO MONITOR.

## 2020-02-07 NOTE — NUR
Patient weaned off from O2 but O2 sat remained on 86% on room air. hCer Paredes informed 
and Krista PATTERSON of MHU informed, with instruction patient still on voluntary and may transfer 
once medically cleared. IS at bedside, instructed of use

## 2020-02-07 NOTE — NUR
RECEIVED PATIENT AWAKE IN BED. A/O X4. NO C/O PAIN AT THIS TIME. FEVER NOTED, 100.5, PATIENT 
GIVEN TYLENOL 650MG PO PRN. ALL OTHER VSS. IVF INFUSING WELL TO RIGHT LOWER FA #20 GAUGE. 
F/C INTACT AND PATENT, NOTED TO GRAVITY. PATIENT WAITING FOR PSYCH CONSULT VIA DR. GREEN. 
CALL LIGHT IN REACH. ALL NEEDS ATTENDED. WILL CONTINUE TO MONITOR AND ASSES.

## 2020-02-08 VITALS — DIASTOLIC BLOOD PRESSURE: 63 MMHG | SYSTOLIC BLOOD PRESSURE: 123 MMHG

## 2020-02-08 VITALS — DIASTOLIC BLOOD PRESSURE: 57 MMHG | SYSTOLIC BLOOD PRESSURE: 122 MMHG

## 2020-02-08 VITALS — DIASTOLIC BLOOD PRESSURE: 45 MMHG | SYSTOLIC BLOOD PRESSURE: 108 MMHG

## 2020-02-08 VITALS — SYSTOLIC BLOOD PRESSURE: 109 MMHG | DIASTOLIC BLOOD PRESSURE: 50 MMHG

## 2020-02-08 VITALS — SYSTOLIC BLOOD PRESSURE: 90 MMHG | DIASTOLIC BLOOD PRESSURE: 36 MMHG

## 2020-02-08 LAB
APPEARANCE UR: (no result)
BASOPHILS # BLD AUTO: 0.1 K/UL (ref 0–8)
BASOPHILS NFR BLD AUTO: 1.2 % (ref 0–2)
BILIRUB UR QL STRIP: NEGATIVE
BUN SERPL-MCNC: 9 MG/DL (ref 7–18)
CHLORIDE SERPL-SCNC: 106 MMOL/L (ref 98–107)
CO2 SERPL-SCNC: 29 MMOL/L (ref 21–32)
COLOR UR: YELLOW
CREAT SERPL-MCNC: 0.6 MG/DL (ref 0.6–1.3)
EOSINOPHIL # BLD AUTO: 0.3 K/UL (ref 0–0.7)
EOSINOPHIL NFR BLD AUTO: 4.2 % (ref 0–7)
GLUCOSE SERPL-MCNC: 101 MG/DL (ref 74–106)
GLUCOSE UR STRIP-MCNC: NEGATIVE MG/DL
HCT VFR BLD AUTO: 31.7 % (ref 31.2–41.9)
HGB BLD-MCNC: 10.5 G/DL (ref 10.9–14.3)
HGB UR QL STRIP: (no result)
KETONES UR STRIP-MCNC: NEGATIVE MG/DL
LEUKOCYTE ESTERASE UR QL STRIP: (no result)
LYMPHOCYTES # BLD AUTO: 2.2 K/UL (ref 20–40)
LYMPHOCYTES NFR BLD AUTO: 28.5 % (ref 20.5–51.5)
MAGNESIUM SERPL-MCNC: 1.9 MG/DL (ref 1.8–2.4)
MCH RBC QN AUTO: 28.2 UUG (ref 24.7–32.8)
MCHC RBC AUTO-ENTMCNC: 33 G/DL (ref 32.3–35.6)
MCV RBC AUTO: 85.2 FL (ref 75.5–95.3)
MONOCYTES # BLD AUTO: 0.4 K/UL (ref 2–10)
MONOCYTES NFR BLD AUTO: 5.4 % (ref 0–11)
NEUTROPHILS # BLD AUTO: 4.7 K/UL (ref 1.8–8.9)
NEUTROPHILS NFR BLD AUTO: 60.7 % (ref 38.5–71.5)
NITRITE UR QL STRIP: NEGATIVE
PH UR STRIP: 8.5 [PH] (ref 5–8)
PHOSPHATE SERPL-MCNC: 2.8 MG/DL (ref 2.5–4.9)
PLATELET # BLD AUTO: 196 K/UL (ref 179–408)
POTASSIUM SERPL-SCNC: 3.6 MMOL/L (ref 3.5–5.1)
RBC # BLD AUTO: 3.71 MIL/UL (ref 3.63–4.92)
RBC #/AREA URNS HPF: (no result) /HPF (ref 0–3)
SP GR UR STRIP: 1.01 (ref 1–1.03)
UROBILINOGEN UR STRIP-MCNC: 1 E.U./DL
WBC # BLD AUTO: 7.8 K/UL (ref 3.8–11.8)

## 2020-02-08 RX ADMIN — Medication PRN MG: at 23:15

## 2020-02-08 RX ADMIN — CHLORHEXIDINE GLUCONATE SCH ML: 1.2 RINSE ORAL at 21:00

## 2020-02-08 RX ADMIN — ARIPIPRAZOLE SCH MG: 5 TABLET ORAL at 09:09

## 2020-02-08 RX ADMIN — ESCITALOPRAM OXALATE SCH MG: 10 TABLET, FILM COATED ORAL at 09:09

## 2020-02-08 RX ADMIN — DEXTROSE SCH MLS/HR: 50 INJECTION, SOLUTION INTRAVENOUS at 20:49

## 2020-02-08 RX ADMIN — HYDROCODONE BITARTRATE AND ACETAMINOPHEN PRN TAB: 5; 325 TABLET ORAL at 21:44

## 2020-02-08 RX ADMIN — PANTOPRAZOLE SODIUM SCH MG: 40 TABLET, DELAYED RELEASE ORAL at 06:13

## 2020-02-08 RX ADMIN — SODIUM CHLORIDE PRN MLS/HR: 0.9 INJECTION, SOLUTION INTRAVENOUS at 01:53

## 2020-02-08 RX ADMIN — ARIPIPRAZOLE SCH MG: 5 TABLET ORAL at 16:58

## 2020-02-08 RX ADMIN — CHLORHEXIDINE GLUCONATE SCH ML: 1.2 RINSE ORAL at 20:49

## 2020-02-08 RX ADMIN — LAMOTRIGINE SCH MG: 200 TABLET ORAL at 20:50

## 2020-02-08 RX ADMIN — HYDROCODONE BITARTRATE AND ACETAMINOPHEN PRN TAB: 5; 325 TABLET ORAL at 14:34

## 2020-02-08 RX ADMIN — HYDROCODONE BITARTRATE AND ACETAMINOPHEN PRN TAB: 5; 325 TABLET ORAL at 06:14

## 2020-02-08 RX ADMIN — CHLORHEXIDINE GLUCONATE SCH ML: 1.2 RINSE ORAL at 12:08

## 2020-02-08 RX ADMIN — FLUVOXAMINE MALEATE SCH MG: 50 TABLET, FILM COATED ORAL at 20:50

## 2020-02-08 RX ADMIN — Medication SCH EACH: at 06:43

## 2020-02-08 RX ADMIN — HYDROCODONE BITARTRATE AND ACETAMINOPHEN PRN TAB: 5; 325 TABLET ORAL at 01:34

## 2020-02-08 NOTE — NUR
PATIENT RESTING IN BED. WOKE UP C/O PAIN ON RIGHT SIDE OF LOWER BACK, C/O SCIATICA PAIN, 
RADIATING DOWN. VSS. PATIENT GIVEN NORCO 1 TAB PO PRN FOR PAIN. CRAIG NOTIFIED LAST 
NIGHT THAT PATIENT WAS C/O OF THIS PAIN. HOT PACKS GIVEN TO ALSO HELP ALLEVIATE DISCOMFORT. 
CONTINUED ON O2 2L NC. NO RESP. DISTRESS NOTED. BED ALARM ON. CALL LIGHT IN REACH. ALL NEEDS 
ATTENDED. WILL CONTINUE TO MONITOR AND ASSESS.

## 2020-02-08 NOTE — NUR
Awake, alert, oriented x 4, O2 at 2L/NC. IS at bedside, re instructed of use, able to reach 
50  - 1000

## 2020-02-08 NOTE — NUR
IVF discontinued. CXR done. Urinalysis collected, specimen sent to lab. Gray catheter 
discontinued. O2 sat room air 88-89%. Placed back om O2 at 2L/NC. Assisted out of bed to the 
chair. IS reinstructed of use.

## 2020-02-09 VITALS — DIASTOLIC BLOOD PRESSURE: 74 MMHG | SYSTOLIC BLOOD PRESSURE: 123 MMHG

## 2020-02-09 VITALS — DIASTOLIC BLOOD PRESSURE: 51 MMHG | SYSTOLIC BLOOD PRESSURE: 135 MMHG

## 2020-02-09 VITALS — SYSTOLIC BLOOD PRESSURE: 114 MMHG | DIASTOLIC BLOOD PRESSURE: 46 MMHG

## 2020-02-09 VITALS — SYSTOLIC BLOOD PRESSURE: 124 MMHG | DIASTOLIC BLOOD PRESSURE: 44 MMHG

## 2020-02-09 LAB
BASOPHILS # BLD AUTO: 0 K/UL (ref 0–8)
BASOPHILS NFR BLD AUTO: 0.6 % (ref 0–2)
BUN SERPL-MCNC: 15 MG/DL (ref 7–18)
CHLORIDE SERPL-SCNC: 102 MMOL/L (ref 98–107)
CO2 SERPL-SCNC: 33 MMOL/L (ref 21–32)
CREAT SERPL-MCNC: 0.9 MG/DL (ref 0.6–1.3)
EOSINOPHIL # BLD AUTO: 0.4 K/UL (ref 0–0.7)
EOSINOPHIL NFR BLD AUTO: 4.8 % (ref 0–7)
GLUCOSE SERPL-MCNC: 92 MG/DL (ref 74–106)
HCT VFR BLD AUTO: 34.3 % (ref 31.2–41.9)
HGB BLD-MCNC: 11.2 G/DL (ref 10.9–14.3)
LYMPHOCYTES # BLD AUTO: 2.2 K/UL (ref 20–40)
LYMPHOCYTES NFR BLD AUTO: 28.4 % (ref 20.5–51.5)
MAGNESIUM SERPL-MCNC: 1.9 MG/DL (ref 1.8–2.4)
MCH RBC QN AUTO: 27.9 UUG (ref 24.7–32.8)
MCHC RBC AUTO-ENTMCNC: 33 G/DL (ref 32.3–35.6)
MCV RBC AUTO: 85.4 FL (ref 75.5–95.3)
MONOCYTES # BLD AUTO: 0.4 K/UL (ref 2–10)
MONOCYTES NFR BLD AUTO: 5 % (ref 0–11)
NEUTROPHILS # BLD AUTO: 4.7 K/UL (ref 1.8–8.9)
NEUTROPHILS NFR BLD AUTO: 61.2 % (ref 38.5–71.5)
PHOSPHATE SERPL-MCNC: 3.8 MG/DL (ref 2.5–4.9)
PLATELET # BLD AUTO: 252 K/UL (ref 179–408)
POTASSIUM SERPL-SCNC: 3.4 MMOL/L (ref 3.5–5.1)
RBC # BLD AUTO: 4.02 MIL/UL (ref 3.63–4.92)
WBC # BLD AUTO: 7.6 K/UL (ref 3.8–11.8)

## 2020-02-09 RX ADMIN — CHLORHEXIDINE GLUCONATE SCH ML: 1.2 RINSE ORAL at 20:33

## 2020-02-09 RX ADMIN — PANTOPRAZOLE SODIUM SCH MG: 40 TABLET, DELAYED RELEASE ORAL at 06:39

## 2020-02-09 RX ADMIN — CHLORHEXIDINE GLUCONATE SCH ML: 1.2 RINSE ORAL at 08:52

## 2020-02-09 RX ADMIN — FLUVOXAMINE MALEATE SCH MG: 50 TABLET, FILM COATED ORAL at 20:33

## 2020-02-09 RX ADMIN — LAMOTRIGINE SCH MG: 200 TABLET ORAL at 20:33

## 2020-02-09 RX ADMIN — ARIPIPRAZOLE SCH MG: 5 TABLET ORAL at 17:33

## 2020-02-09 RX ADMIN — DEXTROSE SCH MLS/HR: 50 INJECTION, SOLUTION INTRAVENOUS at 20:33

## 2020-02-09 RX ADMIN — ESCITALOPRAM OXALATE SCH MG: 10 TABLET, FILM COATED ORAL at 08:53

## 2020-02-09 RX ADMIN — ARIPIPRAZOLE SCH MG: 5 TABLET ORAL at 08:52

## 2020-02-09 RX ADMIN — HYDROCODONE BITARTRATE AND ACETAMINOPHEN PRN TAB: 5; 325 TABLET ORAL at 05:05

## 2020-02-09 RX ADMIN — HYDROCODONE BITARTRATE AND ACETAMINOPHEN PRN TAB: 5; 325 TABLET ORAL at 20:37

## 2020-02-09 NOTE — NUR
Patient slept intermittently. PRN Norco 5-325mg given x 2 this shift for R back and leg 
pain. IV access on RFA 20g intact and patent w/ saline flush. All needs attended. Will 
endorse accordingly

## 2020-02-09 NOTE — NUR
Pt A/O x4. No distress noted or reported. Pt removed supplemental O2 and stated that she 
does not need it right now. O2 Sat on Room air=95%. Pt denied any pain or discomfort at this 
time. Notified Cher Paredes NP re: pt's Potassium=3.4. All morning meds administered 
including Potassium 40 mEq per NP orders. All safety precautions in place. Monitoring 
continued.

## 2020-02-10 VITALS — DIASTOLIC BLOOD PRESSURE: 53 MMHG | SYSTOLIC BLOOD PRESSURE: 111 MMHG

## 2020-02-10 VITALS — SYSTOLIC BLOOD PRESSURE: 120 MMHG | DIASTOLIC BLOOD PRESSURE: 55 MMHG

## 2020-02-10 VITALS — SYSTOLIC BLOOD PRESSURE: 129 MMHG | DIASTOLIC BLOOD PRESSURE: 65 MMHG

## 2020-02-10 VITALS — SYSTOLIC BLOOD PRESSURE: 127 MMHG | DIASTOLIC BLOOD PRESSURE: 55 MMHG

## 2020-02-10 RX ADMIN — HYDROCODONE BITARTRATE AND ACETAMINOPHEN PRN TAB: 5; 325 TABLET ORAL at 08:27

## 2020-02-10 RX ADMIN — Medication PRN MG: at 11:44

## 2020-02-10 RX ADMIN — Medication PRN MG: at 01:19

## 2020-02-10 RX ADMIN — ACETAMINOPHEN PRN MG: 325 TABLET ORAL at 01:16

## 2020-02-10 RX ADMIN — ARIPIPRAZOLE SCH MG: 5 TABLET ORAL at 08:25

## 2020-02-10 RX ADMIN — DEXTROSE SCH MLS/HR: 50 INJECTION, SOLUTION INTRAVENOUS at 20:58

## 2020-02-10 RX ADMIN — CHLORHEXIDINE GLUCONATE SCH ML: 1.2 RINSE ORAL at 08:25

## 2020-02-10 RX ADMIN — FLUVOXAMINE MALEATE SCH MG: 50 TABLET, FILM COATED ORAL at 20:28

## 2020-02-10 RX ADMIN — Medication PRN MG: at 20:28

## 2020-02-10 RX ADMIN — ARIPIPRAZOLE SCH MG: 5 TABLET ORAL at 16:07

## 2020-02-10 RX ADMIN — PANTOPRAZOLE SODIUM SCH MG: 40 TABLET, DELAYED RELEASE ORAL at 06:01

## 2020-02-10 RX ADMIN — LAMOTRIGINE SCH MG: 200 TABLET ORAL at 20:28

## 2020-02-10 RX ADMIN — ESCITALOPRAM OXALATE SCH MG: 10 TABLET, FILM COATED ORAL at 08:26

## 2020-02-10 RX ADMIN — CHLORHEXIDINE GLUCONATE SCH ML: 1.2 RINSE ORAL at 20:28

## 2020-02-10 NOTE — NUR
Patient slept well. No complaints of pain at this time. No SOB noted. Heplock on L hand 
intact and patent w/ saline flush. All needs attended. Will endorse accordingly

## 2020-02-10 NOTE — NUR
received patient Aox2-3, patient compliant with medication, heplock attached, flushed with 
good flow, patient verbalizing pain, pain meds given as ordered, will continue monitor

## 2020-02-11 VITALS — SYSTOLIC BLOOD PRESSURE: 112 MMHG | DIASTOLIC BLOOD PRESSURE: 55 MMHG

## 2020-02-11 VITALS — DIASTOLIC BLOOD PRESSURE: 55 MMHG | SYSTOLIC BLOOD PRESSURE: 112 MMHG

## 2020-02-11 VITALS — SYSTOLIC BLOOD PRESSURE: 122 MMHG | DIASTOLIC BLOOD PRESSURE: 52 MMHG

## 2020-02-11 LAB
BASOPHILS # BLD AUTO: 0 K/UL (ref 0–8)
BASOPHILS NFR BLD AUTO: 0.7 % (ref 0–2)
BUN SERPL-MCNC: 19 MG/DL (ref 7–18)
CHLORIDE SERPL-SCNC: 102 MMOL/L (ref 98–107)
CO2 SERPL-SCNC: 29 MMOL/L (ref 21–32)
CREAT SERPL-MCNC: 0.9 MG/DL (ref 0.6–1.3)
EOSINOPHIL # BLD AUTO: 0.3 K/UL (ref 0–0.7)
EOSINOPHIL NFR BLD AUTO: 4.8 % (ref 0–7)
GLUCOSE SERPL-MCNC: 99 MG/DL (ref 74–106)
HCT VFR BLD AUTO: 34.7 % (ref 31.2–41.9)
HGB BLD-MCNC: 11.3 G/DL (ref 10.9–14.3)
LYMPHOCYTES # BLD AUTO: 2 K/UL (ref 20–40)
LYMPHOCYTES NFR BLD AUTO: 29.5 % (ref 20.5–51.5)
MAGNESIUM SERPL-MCNC: 2 MG/DL (ref 1.8–2.4)
MCH RBC QN AUTO: 27.9 UUG (ref 24.7–32.8)
MCHC RBC AUTO-ENTMCNC: 33 G/DL (ref 32.3–35.6)
MCV RBC AUTO: 85.4 FL (ref 75.5–95.3)
MONOCYTES # BLD AUTO: 0.5 K/UL (ref 2–10)
MONOCYTES NFR BLD AUTO: 7.1 % (ref 0–11)
NEUTROPHILS # BLD AUTO: 4 K/UL (ref 1.8–8.9)
NEUTROPHILS NFR BLD AUTO: 57.9 % (ref 38.5–71.5)
PHOSPHATE SERPL-MCNC: 3.9 MG/DL (ref 2.5–4.9)
PLATELET # BLD AUTO: 286 K/UL (ref 179–408)
POTASSIUM SERPL-SCNC: 4.4 MMOL/L (ref 3.5–5.1)
RBC # BLD AUTO: 4.07 MIL/UL (ref 3.63–4.92)
WBC # BLD AUTO: 6.8 K/UL (ref 3.8–11.8)

## 2020-02-11 RX ADMIN — ESCITALOPRAM OXALATE SCH MG: 10 TABLET, FILM COATED ORAL at 08:58

## 2020-02-11 RX ADMIN — ACETAMINOPHEN PRN MG: 325 TABLET ORAL at 00:25

## 2020-02-11 RX ADMIN — ARIPIPRAZOLE SCH MG: 5 TABLET ORAL at 08:58

## 2020-02-11 RX ADMIN — Medication PRN MG: at 04:15

## 2020-02-11 RX ADMIN — Medication PRN MG: at 00:25

## 2020-02-11 RX ADMIN — Medication PRN MG: at 11:18

## 2020-02-11 RX ADMIN — PANTOPRAZOLE SODIUM SCH MG: 40 TABLET, DELAYED RELEASE ORAL at 07:00

## 2020-02-11 RX ADMIN — CHLORHEXIDINE GLUCONATE SCH ML: 1.2 RINSE ORAL at 08:57

## 2020-02-11 NOTE — NUR
PT HAS BEEN DISCHARGED. DISCHARGE ORDER RECEIVED FROM WILFRIDO LARSEN. NO ACUTE DISTRESS OR SOB 
NOTED. PT DENIES PAIN. DISCHARGE INSTRUCTIONS GIVEN TO PT WITH GOOD UNDERSTANDING. IV LINE 
REMOVED INTACT. RX SENT ELECTRONICALLY PER NP TO PT'S PREFERRED PHARMACY. BELONGINGS LIST 
SIGNED. BELONGINGS AND VALUABLES RETURNED. PT HAS STEADY GAIT. PT ACCOMPANIED TO PRIVATE 
TRANSPORT BY AUNT AND STAFF.

## 2020-02-11 NOTE — NUR
RECEIVED PT RESTING IN BED. NO ACUTE DISTRESS NOTED. NO SOB NOTED. PT DENIES PAIN AT THIS 
TIME. BED LOCKED AND IN LOW POSITION. CALL LIGHT WITHIN REACH. WILL CONTINUE TO MONITOR FOR 
SAFETY AND COMFORT.

## 2024-11-12 ENCOUNTER — HOSPITAL ENCOUNTER (INPATIENT)
Dept: HOSPITAL 12 - ER | Age: 74
LOS: 2 days | Discharge: HOME | DRG: 84 | End: 2024-11-14
Payer: MEDICARE

## 2024-11-12 VITALS — SYSTOLIC BLOOD PRESSURE: 108 MMHG | OXYGEN SATURATION: 95 % | DIASTOLIC BLOOD PRESSURE: 50 MMHG

## 2024-11-12 VITALS — DIASTOLIC BLOOD PRESSURE: 48 MMHG | SYSTOLIC BLOOD PRESSURE: 127 MMHG | OXYGEN SATURATION: 94 %

## 2024-11-12 VITALS — HEIGHT: 63 IN | WEIGHT: 170 LBS | BODY MASS INDEX: 30.12 KG/M2

## 2024-11-12 VITALS — SYSTOLIC BLOOD PRESSURE: 113 MMHG | OXYGEN SATURATION: 92 % | DIASTOLIC BLOOD PRESSURE: 51 MMHG | TEMPERATURE: 97 F

## 2024-11-12 VITALS — DIASTOLIC BLOOD PRESSURE: 44 MMHG | TEMPERATURE: 98.1 F | OXYGEN SATURATION: 92 % | SYSTOLIC BLOOD PRESSURE: 108 MMHG

## 2024-11-12 VITALS — SYSTOLIC BLOOD PRESSURE: 122 MMHG | DIASTOLIC BLOOD PRESSURE: 47 MMHG | OXYGEN SATURATION: 93 %

## 2024-11-12 DIAGNOSIS — Z85.3: ICD-10-CM

## 2024-11-12 DIAGNOSIS — S80.212A: ICD-10-CM

## 2024-11-12 DIAGNOSIS — M50.30: ICD-10-CM

## 2024-11-12 DIAGNOSIS — S06.4XAA: ICD-10-CM

## 2024-11-12 DIAGNOSIS — F42.9: ICD-10-CM

## 2024-11-12 DIAGNOSIS — R40.2252: ICD-10-CM

## 2024-11-12 DIAGNOSIS — F41.9: ICD-10-CM

## 2024-11-12 DIAGNOSIS — Z87.891: ICD-10-CM
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
## 2024-11-12 DIAGNOSIS — S01.01XA: ICD-10-CM

## 2024-11-12 DIAGNOSIS — R40.2142: ICD-10-CM

## 2024-11-12 DIAGNOSIS — K57.30: ICD-10-CM

## 2024-11-12 DIAGNOSIS — F31.9: ICD-10-CM

## 2024-11-12 DIAGNOSIS — Z88.0: ICD-10-CM

## 2024-11-12 DIAGNOSIS — K80.20: ICD-10-CM

## 2024-11-12 DIAGNOSIS — S01.81XA: ICD-10-CM

## 2024-11-12 DIAGNOSIS — R40.2362: ICD-10-CM

## 2024-11-12 DIAGNOSIS — S06.5XAA: Primary | ICD-10-CM

## 2024-11-12 DIAGNOSIS — Z79.899: ICD-10-CM
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
## 2024-11-12 DIAGNOSIS — M17.12: ICD-10-CM

## 2024-11-12 DIAGNOSIS — S93.402A: ICD-10-CM

## 2024-11-12 DIAGNOSIS — S50.01XA: ICD-10-CM

## 2024-11-12 LAB
BASOPHILS # BLD AUTO: 0.2 K/UL (ref 0–0.2)
BASOPHILS NFR BLD AUTO: 2.7 % (ref 0–2)
BUN SERPL-MCNC: 17 MG/DL (ref 7–18)
CALCIUM SERPL-MCNC: 9.5 MG/DL (ref 8.5–10.1)
CHLORIDE SERPL-SCNC: 105 MMOL/L (ref 98–107)
CO2 SERPL-SCNC: 29 MMOL/L (ref 21–32)
CREAT SERPL-MCNC: 0.9 MG/DL (ref 0.6–1.3)
EOSINOPHIL # BLD AUTO: 0.2 K/UL (ref 0–0.7)
EOSINOPHIL NFR BLD AUTO: 2 % (ref 0–7)
ERYTHROCYTE [DISTWIDTH] IN BLOOD BY AUTOMATED COUNT: 13.9 % (ref 12.3–17.7)
GLUCOSE SERPL-MCNC: 98 MG/DL (ref 74–106)
HCT VFR BLD AUTO: 35.4 % (ref 31.2–41.9)
HGB BLD-MCNC: 11.5 G/DL (ref 10.9–14.3)
LYMPHOCYTES # BLD AUTO: 1.6 K/UL (ref 0.8–4.8)
LYMPHOCYTES NFR BLD AUTO: 17.7 % (ref 20.5–51.5)
MANUAL DIF COMMENT BLD-IMP: 0
MCH RBC QN AUTO: 27.4 UUG (ref 24.7–32.8)
MCHC RBC AUTO-ENTMCNC: 33 G/DL (ref 32.3–35.6)
MCV RBC AUTO: 84.4 FL (ref 75.5–95.3)
MONOCYTES # BLD AUTO: 0.2 K/UL (ref 0.1–1.3)
MONOCYTES NFR BLD AUTO: 2.2 % (ref 0–11)
NEUTROPHILS # BLD AUTO: 6.9 K/UL (ref 1.8–8.9)
NEUTROPHILS NFR BLD AUTO: 75.4 % (ref 38.5–71.5)
PLATELET # BLD AUTO: 227 K/UL (ref 179–408)
POTASSIUM SERPL-SCNC: 4.2 MMOL/L (ref 3.5–5.1)
RBC # BLD AUTO: 4.19 MIL/UL (ref 3.63–4.92)
SODIUM SERPL-SCNC: 144 MMOL/L (ref 136–145)
WBC # BLD AUTO: 9.1 K/UL (ref 3.8–11.8)

## 2024-11-12 PROCEDURE — 0HQ1XZZ REPAIR FACE SKIN, EXTERNAL APPROACH: ICD-10-PCS

## 2024-11-12 PROCEDURE — G0378 HOSPITAL OBSERVATION PER HR: HCPCS

## 2024-11-12 PROCEDURE — A9150 MISC/EXPER NON-PRESCRIPT DRU: HCPCS

## 2024-11-12 PROCEDURE — A4663 DIALYSIS BLOOD PRESSURE CUFF: HCPCS

## 2024-11-12 PROCEDURE — A4606 OXYGEN PROBE USED W OXIMETER: HCPCS

## 2024-11-12 RX ADMIN — SODIUM CHLORIDE PRN MLS/HR: 0.9 INJECTION, SOLUTION INTRAVENOUS at 23:22

## 2024-11-12 RX ADMIN — LAMOTRIGINE SCH MG: 200 TABLET ORAL at 21:00

## 2024-11-12 RX ADMIN — LIDOCAINE HYDROCHLORIDE ONE ML: 10 INJECTION, SOLUTION INFILTRATION; PERINEURAL at 12:43

## 2024-11-12 RX ADMIN — ARIPIPRAZOLE SCH MG: 5 TABLET ORAL at 19:41

## 2024-11-12 RX ADMIN — TETANUS TOXOID, REDUCED DIPHTHERIA TOXOID AND ACELLULAR PERTUSSIS VACCINE, ADSORBED ONE ML: 5; 2.5; 8; 8; 2.5 SUSPENSION INTRAMUSCULAR at 12:07

## 2024-11-12 RX ADMIN — ACETAMINOPHEN ONE MG: 500 TABLET ORAL at 12:01

## 2024-11-12 RX ADMIN — BACITRACIN ZINC, NEOMYCIN, POLYMYXIN B ONE PKT: 400; 3.5; 5 OINTMENT TOPICAL at 12:44

## 2024-11-13 VITALS — OXYGEN SATURATION: 96 % | SYSTOLIC BLOOD PRESSURE: 127 MMHG | DIASTOLIC BLOOD PRESSURE: 65 MMHG

## 2024-11-13 VITALS — OXYGEN SATURATION: 96 % | SYSTOLIC BLOOD PRESSURE: 122 MMHG | DIASTOLIC BLOOD PRESSURE: 42 MMHG | TEMPERATURE: 97.8 F

## 2024-11-13 VITALS — DIASTOLIC BLOOD PRESSURE: 45 MMHG | TEMPERATURE: 97.8 F | SYSTOLIC BLOOD PRESSURE: 129 MMHG | OXYGEN SATURATION: 94 %

## 2024-11-13 VITALS — OXYGEN SATURATION: 95 % | TEMPERATURE: 97.9 F | DIASTOLIC BLOOD PRESSURE: 59 MMHG | SYSTOLIC BLOOD PRESSURE: 91 MMHG

## 2024-11-13 VITALS — DIASTOLIC BLOOD PRESSURE: 56 MMHG | OXYGEN SATURATION: 94 % | SYSTOLIC BLOOD PRESSURE: 117 MMHG

## 2024-11-13 VITALS — SYSTOLIC BLOOD PRESSURE: 124 MMHG | TEMPERATURE: 98.1 F | DIASTOLIC BLOOD PRESSURE: 44 MMHG | OXYGEN SATURATION: 94 %

## 2024-11-13 VITALS — SYSTOLIC BLOOD PRESSURE: 95 MMHG | OXYGEN SATURATION: 97 % | TEMPERATURE: 97.8 F | DIASTOLIC BLOOD PRESSURE: 48 MMHG

## 2024-11-13 VITALS — SYSTOLIC BLOOD PRESSURE: 108 MMHG | OXYGEN SATURATION: 95 % | TEMPERATURE: 98 F | DIASTOLIC BLOOD PRESSURE: 57 MMHG

## 2024-11-13 VITALS — OXYGEN SATURATION: 94 % | DIASTOLIC BLOOD PRESSURE: 51 MMHG | SYSTOLIC BLOOD PRESSURE: 122 MMHG

## 2024-11-13 VITALS — OXYGEN SATURATION: 95 % | DIASTOLIC BLOOD PRESSURE: 58 MMHG | TEMPERATURE: 97.8 F | SYSTOLIC BLOOD PRESSURE: 106 MMHG

## 2024-11-13 VITALS — OXYGEN SATURATION: 95 % | SYSTOLIC BLOOD PRESSURE: 112 MMHG | DIASTOLIC BLOOD PRESSURE: 48 MMHG

## 2024-11-13 VITALS — TEMPERATURE: 98.5 F | DIASTOLIC BLOOD PRESSURE: 41 MMHG | OXYGEN SATURATION: 95 % | SYSTOLIC BLOOD PRESSURE: 94 MMHG

## 2024-11-13 VITALS — OXYGEN SATURATION: 94 % | DIASTOLIC BLOOD PRESSURE: 57 MMHG | SYSTOLIC BLOOD PRESSURE: 122 MMHG

## 2024-11-13 VITALS — DIASTOLIC BLOOD PRESSURE: 66 MMHG | SYSTOLIC BLOOD PRESSURE: 123 MMHG

## 2024-11-13 VITALS — SYSTOLIC BLOOD PRESSURE: 123 MMHG | DIASTOLIC BLOOD PRESSURE: 51 MMHG | OXYGEN SATURATION: 97 % | TEMPERATURE: 97.8 F

## 2024-11-13 VITALS — TEMPERATURE: 97.3 F | DIASTOLIC BLOOD PRESSURE: 41 MMHG | SYSTOLIC BLOOD PRESSURE: 100 MMHG | OXYGEN SATURATION: 95 %

## 2024-11-13 VITALS — SYSTOLIC BLOOD PRESSURE: 111 MMHG | OXYGEN SATURATION: 94 % | DIASTOLIC BLOOD PRESSURE: 51 MMHG

## 2024-11-13 VITALS — SYSTOLIC BLOOD PRESSURE: 110 MMHG | OXYGEN SATURATION: 95 % | DIASTOLIC BLOOD PRESSURE: 48 MMHG

## 2024-11-13 VITALS — DIASTOLIC BLOOD PRESSURE: 42 MMHG | OXYGEN SATURATION: 94 % | SYSTOLIC BLOOD PRESSURE: 110 MMHG

## 2024-11-13 LAB
BASOPHILS # BLD AUTO: 0 K/UL (ref 0–0.2)
BASOPHILS NFR BLD AUTO: 0.7 % (ref 0–2)
BUN SERPL-MCNC: 17 MG/DL (ref 7–18)
CALCIUM SERPL-MCNC: 8.1 MG/DL (ref 8.5–10.1)
CHLORIDE SERPL-SCNC: 107 MMOL/L (ref 98–107)
CO2 SERPL-SCNC: 31 MMOL/L (ref 21–32)
CREAT SERPL-MCNC: 0.9 MG/DL (ref 0.6–1.3)
EOSINOPHIL # BLD AUTO: 0.3 K/UL (ref 0–0.7)
EOSINOPHIL NFR BLD AUTO: 5.2 % (ref 0–7)
ERYTHROCYTE [DISTWIDTH] IN BLOOD BY AUTOMATED COUNT: 13.7 % (ref 12.3–17.7)
GLUCOSE SERPL-MCNC: 102 MG/DL (ref 74–106)
HCT VFR BLD AUTO: 32.3 % (ref 31.2–41.9)
HGB BLD-MCNC: 10.6 G/DL (ref 10.9–14.3)
LYMPHOCYTES # BLD AUTO: 2 K/UL (ref 0.8–4.8)
LYMPHOCYTES NFR BLD AUTO: 31.1 % (ref 20.5–51.5)
MAGNESIUM SERPL-MCNC: 2 MG/DL (ref 1.8–2.4)
MCH RBC QN AUTO: 27.7 UUG (ref 24.7–32.8)
MCHC RBC AUTO-ENTMCNC: 33 G/DL (ref 32.3–35.6)
MCV RBC AUTO: 84.1 FL (ref 75.5–95.3)
MONOCYTES # BLD AUTO: 0.4 K/UL (ref 0.1–1.3)
MONOCYTES NFR BLD AUTO: 6.8 % (ref 0–11)
NEUTROPHILS # BLD AUTO: 3.6 K/UL (ref 1.8–8.9)
NEUTROPHILS NFR BLD AUTO: 56.2 % (ref 38.5–71.5)
PHOSPHATE SERPL-MCNC: 2.9 MG/DL (ref 2.5–4.9)
PLATELET # BLD AUTO: 191 K/UL (ref 179–408)
POTASSIUM SERPL-SCNC: 4.1 MMOL/L (ref 3.5–5.1)
RBC # BLD AUTO: 3.84 MIL/UL (ref 3.63–4.92)
SODIUM SERPL-SCNC: 142 MMOL/L (ref 136–145)
WBC # BLD AUTO: 6.5 K/UL (ref 3.8–11.8)

## 2024-11-13 RX ADMIN — Medication SCH MG: at 10:11

## 2024-11-13 RX ADMIN — Medication SCH MG: at 10:10

## 2024-11-13 RX ADMIN — DONEPEZIL HYDROCHLORIDE SCH MG: 5 TABLET, FILM COATED ORAL at 10:13

## 2024-11-13 RX ADMIN — PANTOPRAZOLE SODIUM SCH MG: 40 TABLET, DELAYED RELEASE ORAL at 06:09

## 2024-11-13 RX ADMIN — MINOXIDIL SCH MG: 2.5 TABLET ORAL at 10:12

## 2024-11-14 VITALS — SYSTOLIC BLOOD PRESSURE: 130 MMHG | TEMPERATURE: 97.9 F | OXYGEN SATURATION: 95 % | DIASTOLIC BLOOD PRESSURE: 56 MMHG

## 2024-11-14 VITALS — TEMPERATURE: 98.2 F | OXYGEN SATURATION: 98 % | DIASTOLIC BLOOD PRESSURE: 56 MMHG | SYSTOLIC BLOOD PRESSURE: 129 MMHG

## 2024-11-14 VITALS — DIASTOLIC BLOOD PRESSURE: 49 MMHG | SYSTOLIC BLOOD PRESSURE: 113 MMHG | OXYGEN SATURATION: 93 % | TEMPERATURE: 98.4 F

## 2024-11-14 VITALS — OXYGEN SATURATION: 95 % | TEMPERATURE: 97.9 F | SYSTOLIC BLOOD PRESSURE: 134 MMHG | DIASTOLIC BLOOD PRESSURE: 52 MMHG

## 2024-11-14 VITALS — SYSTOLIC BLOOD PRESSURE: 118 MMHG | TEMPERATURE: 98.2 F | OXYGEN SATURATION: 97 % | DIASTOLIC BLOOD PRESSURE: 40 MMHG

## 2024-11-20 ENCOUNTER — HOSPITAL ENCOUNTER (EMERGENCY)
Dept: HOSPITAL 12 - ER | Age: 74
Discharge: HOME | End: 2024-11-20
Payer: MEDICARE

## 2024-11-20 VITALS — DIASTOLIC BLOOD PRESSURE: 87 MMHG | SYSTOLIC BLOOD PRESSURE: 134 MMHG | OXYGEN SATURATION: 97 %

## 2024-11-20 VITALS — BODY MASS INDEX: 30.12 KG/M2 | HEIGHT: 63 IN | WEIGHT: 170 LBS

## 2024-11-20 DIAGNOSIS — Z60.2: ICD-10-CM

## 2024-11-20 DIAGNOSIS — F41.9: ICD-10-CM

## 2024-11-20 DIAGNOSIS — S01.81XD: Primary | ICD-10-CM

## 2024-11-20 DIAGNOSIS — Z79.899: ICD-10-CM
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
## 2024-11-20 DIAGNOSIS — F42.9: ICD-10-CM

## 2024-11-20 DIAGNOSIS — Z88.0: ICD-10-CM

## 2024-11-20 DIAGNOSIS — Z48.02: ICD-10-CM

## 2024-11-20 DIAGNOSIS — F31.9: ICD-10-CM

## 2024-11-20 PROCEDURE — A4663 DIALYSIS BLOOD PRESSURE CUFF: HCPCS

## 2024-11-20 PROCEDURE — A4606 OXYGEN PROBE USED W OXIMETER: HCPCS

## 2024-11-20 SDOH — SOCIAL STABILITY - SOCIAL INSECURITY: PROBLEMS RELATED TO LIVING ALONE: Z60.2
